# Patient Record
Sex: FEMALE | Race: OTHER | HISPANIC OR LATINO
[De-identification: names, ages, dates, MRNs, and addresses within clinical notes are randomized per-mention and may not be internally consistent; named-entity substitution may affect disease eponyms.]

---

## 2018-01-08 ENCOUNTER — RX ONLY (RX ONLY)
Age: 53
End: 2018-01-08

## 2018-12-12 ENCOUNTER — NEW PATIENT (OUTPATIENT)
Dept: URBAN - METROPOLITAN AREA CLINIC 56 | Facility: CLINIC | Age: 53
End: 2018-12-12
Payer: COMMERCIAL

## 2018-12-12 DIAGNOSIS — H52.223 REGULAR ASTIGMATISM, BILATERAL: Primary | ICD-10-CM

## 2018-12-12 PROCEDURE — 92015 DETERMINE REFRACTIVE STATE: CPT | Performed by: OPTOMETRIST

## 2018-12-12 PROCEDURE — 92002 INTRM OPH EXAM NEW PATIENT: CPT | Performed by: OPTOMETRIST

## 2018-12-12 ASSESSMENT — KERATOMETRY
OS: 44.06
OD: 44.00

## 2018-12-12 ASSESSMENT — VISUAL ACUITY
OS: 20/20
OD: 20/20

## 2021-06-14 ENCOUNTER — OFFICE VISIT (OUTPATIENT)
Dept: URBAN - METROPOLITAN AREA CLINIC 56 | Facility: CLINIC | Age: 56
End: 2021-06-14
Payer: COMMERCIAL

## 2021-06-14 DIAGNOSIS — H02.831 DERMATOCHALASIS OF RIGHT UPPER EYELID: Primary | ICD-10-CM

## 2021-06-14 DIAGNOSIS — Z41.1 ENCOUNTER FOR COSMETIC SURGERY: ICD-10-CM

## 2021-06-14 DIAGNOSIS — H02.834 DERMATOCHALASIS OF LEFT UPPER EYELID: ICD-10-CM

## 2021-06-14 PROCEDURE — 92081 LIMITED VISUAL FIELD XM: CPT | Performed by: OPHTHALMOLOGY

## 2021-06-14 PROCEDURE — 99204 OFFICE O/P NEW MOD 45 MIN: CPT | Performed by: OPHTHALMOLOGY

## 2021-06-14 PROCEDURE — 92285 EXTERNAL OCULAR PHOTOGRAPHY: CPT | Performed by: OPHTHALMOLOGY

## 2021-06-14 NOTE — IMPRESSION/PLAN
Impression: Encounter for cosmetic surgery: Z41.1. Plan: The patient inquired about improving the appearance of excess upper eyelid skin and hooding. We discussed cosmetic upper eyelid blepharoplasty to reduce the excess/redundant skin in the upper eyelid region. R/B/A discussed. Explained that I will not perform brow lift without concurrent BULB. All questions answered.

## 2021-06-14 NOTE — IMPRESSION/PLAN
Impression: Encounter for cosmetic surgery: Z41.1. Plan: pt demonstrates eyebrow ptosis which contributes to upper eyelid hooding and we discussed surgical options including endobrow, direct brow, pretrichial brow lift, and internal brow lifting techniques. After review of these options, we selected cosmetic pretrichial brow lift as the best option given the patient's anatomy, desires, and means. We discussed the r/b/a of this procedure. Discussed risks of temporal nerve branch damage as well as hair loss in the area. Discussed different options including endoscopic brow lift vs. coronal brow lift. Patient will think about what she would like to pursue and discussing pricing with Erika Huynh today. If she decides to move forward with surgery, she will come back for additional evaluation.

## 2021-06-24 ENCOUNTER — OFFICE VISIT (OUTPATIENT)
Dept: URBAN - METROPOLITAN AREA CLINIC 44 | Facility: CLINIC | Age: 56
End: 2021-06-24
Payer: COMMERCIAL

## 2021-06-24 NOTE — IMPRESSION/PLAN
Impression: Encounter for cosmetic surgery: Z41.1. Plan: Detailed discussion held with patient regarding upcoming cosmetic BULB and endoscopic brow lift surgery. 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 We discussed incision placement and RBAI of surgery, including asymmetry, hairloss, and risk of damage to temporal branch of facial nerve, in addition to bleeding, infection, and the need for more surgery. I explained that if she is not ready to have surgery, that is okay, and I would rather her wait to have surgery when she is in a positive mental state rather than move forward with surgery when she is apprehensive or significantly worried/anxious. Left brow is slightly tonic, and I recommended 2 units of botox to left frontalis to equalize brow height prior to surgery. RBAI of botox d/w patient. All questions answered. Botox injected in pattern noted today (3 units). Explained that we will move forward with surgery as long as we have the endoscopic equipment from the device rep in time for surgery.

## 2021-07-02 ENCOUNTER — SURGERY (OUTPATIENT)
Dept: URBAN - METROPOLITAN AREA SURGERY 19 | Facility: SURGERY | Age: 56
End: 2021-07-02

## 2021-07-02 RX ORDER — HYDROCODONE BITARTRATE AND ACETAMINOPHEN 5; 325 MG/1; MG/1
TABLET ORAL
Qty: 16 | Refills: 0 | Status: ACTIVE
Start: 2021-07-02

## 2021-07-12 ENCOUNTER — POST-OPERATIVE VISIT (OUTPATIENT)
Dept: URBAN - METROPOLITAN AREA CLINIC 56 | Facility: CLINIC | Age: 56
End: 2021-07-12

## 2021-07-12 PROCEDURE — 99024 POSTOP FOLLOW-UP VISIT: CPT | Performed by: OPHTHALMOLOGY

## 2021-07-12 NOTE — IMPRESSION/PLAN
Impression: S/P Cosmetic Both Upper Lid Blepharoplasty; Cosmetic Endoscope Brow Lift - Both Brows OU - 10 Days. Encounter for other specified surgical aftercare  Z48.89. Plan: Pt very pleased with outcome of surgery . Sutures and Staples removed today on exam. Continue Maxitrol nya BID for 1 week then stop. RTC 1 month or sooner PRN.

## 2021-08-09 ENCOUNTER — POST-OPERATIVE VISIT (OUTPATIENT)
Dept: URBAN - METROPOLITAN AREA CLINIC 56 | Facility: CLINIC | Age: 56
End: 2021-08-09

## 2021-08-09 PROCEDURE — 99024 POSTOP FOLLOW-UP VISIT: CPT | Performed by: OPHTHALMOLOGY

## 2021-08-09 NOTE — IMPRESSION/PLAN
Impression: S/P Cosmetic Both Upper Lid Blepharoplasty; Cosmetic Endoscope Brow Lift - Both Brows OU - 38 Days. Encounter for other specified surgical aftercare  Z48.89. Excellent post op course   Post operative instructions reviewed - Condition is improving - Plan: very happy with results of surgery. Excellent elevation of brows OU and improvement of symmetry. Small area of nodular scarring at lateral aspect of RUL incision. RBAI of kenalog injection discussed with patient today. All questions answered. Area injected without complication. Photo consent signed. RTC 3 weeks for repeat Kenalog and possibly repeat botox in left frontalis.

## 2021-09-09 ENCOUNTER — POST-OPERATIVE VISIT (OUTPATIENT)
Dept: URBAN - METROPOLITAN AREA CLINIC 44 | Facility: CLINIC | Age: 56
End: 2021-09-09
Payer: COMMERCIAL

## 2021-09-09 PROCEDURE — 99024 POSTOP FOLLOW-UP VISIT: CPT | Performed by: OPHTHALMOLOGY

## 2021-10-01 ENCOUNTER — POST-OPERATIVE VISIT (OUTPATIENT)
Dept: URBAN - METROPOLITAN AREA CLINIC 56 | Facility: CLINIC | Age: 56
End: 2021-10-01

## 2021-10-01 DIAGNOSIS — Z48.89 ENCOUNTER FOR OTHER SPECIFIED SURGICAL AFTERCARE: Primary | ICD-10-CM

## 2021-10-01 NOTE — IMPRESSION/PLAN
Impression: S/P Cosmetic Both Upper Lid Blepharoplasty; Cosmetic Endoscope Brow Lift - Both Brows OU - 91 Days. Encounter for other specified surgical aftercare  Z48.89. Excellent post op course   Post operative instructions reviewed - Condition is improving - Plan: patient very happy with results of surgery. additional photos taken today. photo consent signed at previous appointment. patient offered to speak with other patients who have questions about procedure in future. IT was a pleasure caring for Neelam, and she knows to follow up with me any time if she develops new problems or concerns.